# Patient Record
Sex: MALE | Race: WHITE | URBAN - METROPOLITAN AREA
[De-identification: names, ages, dates, MRNs, and addresses within clinical notes are randomized per-mention and may not be internally consistent; named-entity substitution may affect disease eponyms.]

---

## 2020-12-26 ENCOUNTER — EMERGENCY (EMERGENCY)
Facility: HOSPITAL | Age: 70
LOS: 1 days | Discharge: ANOTHER HOSPITAL | End: 2020-12-26
Attending: EMERGENCY MEDICINE | Admitting: EMERGENCY MEDICINE
Payer: MEDICARE

## 2020-12-26 ENCOUNTER — INPATIENT (INPATIENT)
Facility: HOSPITAL | Age: 70
LOS: 2 days | Discharge: HOME | DRG: 501 | End: 2020-12-29
Attending: STUDENT IN AN ORGANIZED HEALTH CARE EDUCATION/TRAINING PROGRAM | Admitting: STUDENT IN AN ORGANIZED HEALTH CARE EDUCATION/TRAINING PROGRAM
Payer: MEDICARE

## 2020-12-26 VITALS
DIASTOLIC BLOOD PRESSURE: 87 MMHG | RESPIRATION RATE: 18 BRPM | HEART RATE: 78 BPM | HEIGHT: 74 IN | TEMPERATURE: 98 F | SYSTOLIC BLOOD PRESSURE: 149 MMHG | WEIGHT: 169.98 LBS | OXYGEN SATURATION: 99 %

## 2020-12-26 VITALS
RESPIRATION RATE: 18 BRPM | OXYGEN SATURATION: 97 % | SYSTOLIC BLOOD PRESSURE: 126 MMHG | DIASTOLIC BLOOD PRESSURE: 81 MMHG | HEART RATE: 81 BPM | HEIGHT: 74 IN | TEMPERATURE: 97 F

## 2020-12-26 VITALS
SYSTOLIC BLOOD PRESSURE: 156 MMHG | DIASTOLIC BLOOD PRESSURE: 69 MMHG | OXYGEN SATURATION: 98 % | HEART RATE: 70 BPM | RESPIRATION RATE: 16 BRPM

## 2020-12-26 DIAGNOSIS — M54.5 LOW BACK PAIN: ICD-10-CM

## 2020-12-26 DIAGNOSIS — S52.572A OTHER INTRAARTICULAR FRACTURE OF LOWER END OF LEFT RADIUS, INITIAL ENCOUNTER FOR CLOSED FRACTURE: ICD-10-CM
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
## 2020-12-26 DIAGNOSIS — Z20.828 CONTACT WITH AND (SUSPECTED) EXPOSURE TO OTHER VIRAL COMMUNICABLE DISEASES: ICD-10-CM
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
## 2020-12-26 DIAGNOSIS — S62.102A FRACTURE OF UNSPECIFIED CARPAL BONE, LEFT WRIST, INITIAL ENCOUNTER FOR CLOSED FRACTURE: ICD-10-CM
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
## 2020-12-26 LAB
ALBUMIN SERPL ELPH-MCNC: 4.1 G/DL — SIGNIFICANT CHANGE UP (ref 3.4–5)
ALP SERPL-CCNC: 67 U/L — SIGNIFICANT CHANGE UP (ref 40–120)
ALT FLD-CCNC: 19 U/L — SIGNIFICANT CHANGE UP (ref 12–42)
ANION GAP SERPL CALC-SCNC: 10 MMOL/L — SIGNIFICANT CHANGE UP (ref 5–17)
ANION GAP SERPL CALC-SCNC: 12 MMOL/L — SIGNIFICANT CHANGE UP (ref 9–16)
APPEARANCE UR: CLEAR — SIGNIFICANT CHANGE UP
APTT BLD: 30.4 SEC — SIGNIFICANT CHANGE UP (ref 27.5–35.5)
AST SERPL-CCNC: 32 U/L — SIGNIFICANT CHANGE UP (ref 15–37)
BASOPHILS # BLD AUTO: 0.01 K/UL — SIGNIFICANT CHANGE UP (ref 0–0.2)
BASOPHILS NFR BLD AUTO: 0.2 % — SIGNIFICANT CHANGE UP (ref 0–2)
BILIRUB SERPL-MCNC: 0.7 MG/DL — SIGNIFICANT CHANGE UP (ref 0.2–1.2)
BILIRUB UR-MCNC: NEGATIVE — SIGNIFICANT CHANGE UP
BUN SERPL-MCNC: 26 MG/DL — HIGH (ref 7–23)
BUN SERPL-MCNC: 33 MG/DL — HIGH (ref 7–23)
CALCIUM SERPL-MCNC: 8.8 MG/DL — SIGNIFICANT CHANGE UP (ref 8.4–10.5)
CALCIUM SERPL-MCNC: 9.1 MG/DL — SIGNIFICANT CHANGE UP (ref 8.5–10.5)
CHLORIDE SERPL-SCNC: 108 MMOL/L — SIGNIFICANT CHANGE UP (ref 96–108)
CHLORIDE SERPL-SCNC: 111 MMOL/L — HIGH (ref 96–108)
CO2 SERPL-SCNC: 26 MMOL/L — SIGNIFICANT CHANGE UP (ref 22–31)
CO2 SERPL-SCNC: 27 MMOL/L — SIGNIFICANT CHANGE UP (ref 22–31)
COLOR SPEC: YELLOW — SIGNIFICANT CHANGE UP
CREAT SERPL-MCNC: 0.96 MG/DL — SIGNIFICANT CHANGE UP (ref 0.5–1.3)
CREAT SERPL-MCNC: 1.21 MG/DL — SIGNIFICANT CHANGE UP (ref 0.5–1.3)
DIFF PNL FLD: NEGATIVE — SIGNIFICANT CHANGE UP
EOSINOPHIL # BLD AUTO: 0.05 K/UL — SIGNIFICANT CHANGE UP (ref 0–0.5)
EOSINOPHIL NFR BLD AUTO: 0.8 % — SIGNIFICANT CHANGE UP (ref 0–6)
GLUCOSE SERPL-MCNC: 112 MG/DL — HIGH (ref 70–99)
GLUCOSE SERPL-MCNC: 127 MG/DL — HIGH (ref 70–99)
GLUCOSE UR QL: NEGATIVE — SIGNIFICANT CHANGE UP
HCT VFR BLD CALC: 39 % — SIGNIFICANT CHANGE UP (ref 39–50)
HGB BLD-MCNC: 12.8 G/DL — LOW (ref 13–17)
IMM GRANULOCYTES NFR BLD AUTO: 0.3 % — SIGNIFICANT CHANGE UP (ref 0–1.5)
INR BLD: 1 — SIGNIFICANT CHANGE UP (ref 0.88–1.16)
KETONES UR-MCNC: NEGATIVE — SIGNIFICANT CHANGE UP
LEUKOCYTE ESTERASE UR-ACNC: NEGATIVE — SIGNIFICANT CHANGE UP
LYMPHOCYTES # BLD AUTO: 0.96 K/UL — LOW (ref 1–3.3)
LYMPHOCYTES # BLD AUTO: 14.5 % — SIGNIFICANT CHANGE UP (ref 13–44)
MCHC RBC-ENTMCNC: 29.9 PG — SIGNIFICANT CHANGE UP (ref 27–34)
MCHC RBC-ENTMCNC: 32.8 GM/DL — SIGNIFICANT CHANGE UP (ref 32–36)
MCV RBC AUTO: 91.1 FL — SIGNIFICANT CHANGE UP (ref 80–100)
MONOCYTES # BLD AUTO: 0.34 K/UL — SIGNIFICANT CHANGE UP (ref 0–0.9)
MONOCYTES NFR BLD AUTO: 5.1 % — SIGNIFICANT CHANGE UP (ref 2–14)
NEUTROPHILS # BLD AUTO: 5.25 K/UL — SIGNIFICANT CHANGE UP (ref 1.8–7.4)
NEUTROPHILS NFR BLD AUTO: 79.1 % — HIGH (ref 43–77)
NITRITE UR-MCNC: NEGATIVE — SIGNIFICANT CHANGE UP
NRBC # BLD: 0 /100 WBCS — SIGNIFICANT CHANGE UP (ref 0–0)
PH UR: 7 — SIGNIFICANT CHANGE UP (ref 5–8)
PLATELET # BLD AUTO: 198 K/UL — SIGNIFICANT CHANGE UP (ref 150–400)
POTASSIUM SERPL-MCNC: 3.9 MMOL/L — SIGNIFICANT CHANGE UP (ref 3.5–5.3)
POTASSIUM SERPL-MCNC: 4.1 MMOL/L — SIGNIFICANT CHANGE UP (ref 3.5–5.3)
POTASSIUM SERPL-SCNC: 3.9 MMOL/L — SIGNIFICANT CHANGE UP (ref 3.5–5.3)
POTASSIUM SERPL-SCNC: 4.1 MMOL/L — SIGNIFICANT CHANGE UP (ref 3.5–5.3)
PROT SERPL-MCNC: 7.3 G/DL — SIGNIFICANT CHANGE UP (ref 6.4–8.2)
PROT UR-MCNC: NEGATIVE MG/DL — SIGNIFICANT CHANGE UP
PROTHROM AB SERPL-ACNC: 12 SEC — SIGNIFICANT CHANGE UP (ref 10.6–13.6)
RBC # BLD: 4.28 M/UL — SIGNIFICANT CHANGE UP (ref 4.2–5.8)
RBC # FLD: 13.3 % — SIGNIFICANT CHANGE UP (ref 10.3–14.5)
SARS-COV-2 RNA SPEC QL NAA+PROBE: SIGNIFICANT CHANGE UP
SODIUM SERPL-SCNC: 144 MMOL/L — SIGNIFICANT CHANGE UP (ref 135–145)
SODIUM SERPL-SCNC: 150 MMOL/L — HIGH (ref 132–145)
SP GR SPEC: 1.02 — SIGNIFICANT CHANGE UP (ref 1–1.03)
UROBILINOGEN FLD QL: 0.2 E.U./DL — SIGNIFICANT CHANGE UP
WBC # BLD: 6.63 K/UL — SIGNIFICANT CHANGE UP (ref 3.8–10.5)
WBC # FLD AUTO: 6.63 K/UL — SIGNIFICANT CHANGE UP (ref 3.8–10.5)

## 2020-12-26 PROCEDURE — 71045 X-RAY EXAM CHEST 1 VIEW: CPT | Mod: 26

## 2020-12-26 PROCEDURE — 70450 CT HEAD/BRAIN W/O DYE: CPT | Mod: 26

## 2020-12-26 PROCEDURE — 99222 1ST HOSP IP/OBS MODERATE 55: CPT | Mod: GC

## 2020-12-26 PROCEDURE — 99285 EMERGENCY DEPT VISIT HI MDM: CPT

## 2020-12-26 PROCEDURE — 73110 X-RAY EXAM OF WRIST: CPT | Mod: 26,LT

## 2020-12-26 PROCEDURE — 72192 CT PELVIS W/O DYE: CPT | Mod: 26

## 2020-12-26 PROCEDURE — 72131 CT LUMBAR SPINE W/O DYE: CPT | Mod: 26

## 2020-12-26 PROCEDURE — 73110 X-RAY EXAM OF WRIST: CPT | Mod: 26,76,RT

## 2020-12-26 PROCEDURE — 99284 EMERGENCY DEPT VISIT MOD MDM: CPT | Mod: 25

## 2020-12-26 PROCEDURE — 73200 CT UPPER EXTREMITY W/O DYE: CPT | Mod: 26,LT

## 2020-12-26 PROCEDURE — 93010 ELECTROCARDIOGRAM REPORT: CPT

## 2020-12-26 PROCEDURE — 93010 ELECTROCARDIOGRAM REPORT: CPT | Mod: 77

## 2020-12-26 RX ORDER — OMEPRAZOLE 10 MG/1
1 CAPSULE, DELAYED RELEASE ORAL
Qty: 0 | Refills: 0 | DISCHARGE

## 2020-12-26 RX ORDER — MORPHINE SULFATE 50 MG/1
2 CAPSULE, EXTENDED RELEASE ORAL ONCE
Refills: 0 | Status: DISCONTINUED | OUTPATIENT
Start: 2020-12-26 | End: 2020-12-26

## 2020-12-26 RX ORDER — ACETAMINOPHEN 500 MG
1000 TABLET ORAL EVERY 8 HOURS
Refills: 0 | Status: DISCONTINUED | OUTPATIENT
Start: 2020-12-26 | End: 2020-12-27

## 2020-12-26 RX ORDER — PANTOPRAZOLE SODIUM 20 MG/1
40 TABLET, DELAYED RELEASE ORAL
Refills: 0 | Status: DISCONTINUED | OUTPATIENT
Start: 2020-12-26 | End: 2020-12-27

## 2020-12-26 RX ORDER — OXYCODONE HYDROCHLORIDE 5 MG/1
5 TABLET ORAL EVERY 4 HOURS
Refills: 0 | Status: DISCONTINUED | OUTPATIENT
Start: 2020-12-26 | End: 2020-12-27

## 2020-12-26 RX ORDER — SODIUM CHLORIDE 9 MG/ML
1000 INJECTION, SOLUTION INTRAVENOUS
Refills: 0 | Status: DISCONTINUED | OUTPATIENT
Start: 2020-12-27 | End: 2020-12-29

## 2020-12-26 RX ORDER — OXYCODONE AND ACETAMINOPHEN 5; 325 MG/1; MG/1
1 TABLET ORAL ONCE
Refills: 0 | Status: DISCONTINUED | OUTPATIENT
Start: 2020-12-26 | End: 2020-12-26

## 2020-12-26 RX ORDER — ONDANSETRON 8 MG/1
4 TABLET, FILM COATED ORAL EVERY 6 HOURS
Refills: 0 | Status: DISCONTINUED | OUTPATIENT
Start: 2020-12-26 | End: 2020-12-27

## 2020-12-26 RX ORDER — HYDROMORPHONE HYDROCHLORIDE 2 MG/ML
0.5 INJECTION INTRAMUSCULAR; INTRAVENOUS; SUBCUTANEOUS EVERY 4 HOURS
Refills: 0 | Status: DISCONTINUED | OUTPATIENT
Start: 2020-12-26 | End: 2020-12-27

## 2020-12-26 RX ORDER — SENNA PLUS 8.6 MG/1
2 TABLET ORAL AT BEDTIME
Refills: 0 | Status: DISCONTINUED | OUTPATIENT
Start: 2020-12-26 | End: 2020-12-27

## 2020-12-26 RX ORDER — SODIUM CHLORIDE 9 MG/ML
1000 INJECTION INTRAMUSCULAR; INTRAVENOUS; SUBCUTANEOUS ONCE
Refills: 0 | Status: COMPLETED | OUTPATIENT
Start: 2020-12-26 | End: 2020-12-26

## 2020-12-26 RX ORDER — OXYCODONE HYDROCHLORIDE 5 MG/1
10 TABLET ORAL EVERY 4 HOURS
Refills: 0 | Status: DISCONTINUED | OUTPATIENT
Start: 2020-12-26 | End: 2020-12-27

## 2020-12-26 RX ADMIN — MORPHINE SULFATE 2 MILLIGRAM(S): 50 CAPSULE, EXTENDED RELEASE ORAL at 23:51

## 2020-12-26 RX ADMIN — SODIUM CHLORIDE 1000 MILLILITER(S): 9 INJECTION INTRAMUSCULAR; INTRAVENOUS; SUBCUTANEOUS at 18:20

## 2020-12-26 RX ADMIN — OXYCODONE AND ACETAMINOPHEN 1 TABLET(S): 5; 325 TABLET ORAL at 15:23

## 2020-12-26 NOTE — ED PROVIDER NOTE - CLINICAL SUMMARY MEDICAL DECISION MAKING FREE TEXT BOX
Pt S/P fall, lower back pain, L wrist deformity, likely fractured. Will get imaging, provide analgesia and reassess.

## 2020-12-26 NOTE — ED ADULT TRIAGE NOTE - CHIEF COMPLAINT QUOTE
c/o sacral pain and left wrist pain s/p mechanical fall. +deformity to wrist. denies head injury or AC use. c/o sacral pain and left wrist pain s/p mechanical fall. reports falling backwards and break fall with left arm. +deformity to wrist. denies head injury or AC use.

## 2020-12-26 NOTE — ED ADULT NURSE NOTE - OBJECTIVE STATEMENT
Patient alert and oriented x 3 came transferred from [facility] for ortho consult , patient s/p tripped and fall on the curb and fell backward was trying to break his fall with his left arm . Denies any head injury , no loc not on thinner . Noted soft cast on left wrist . With heplock on rt ac 18 g intact . With pain of 5/10 at this time . Awaiting to be seen , will continue to monitor .

## 2020-12-26 NOTE — ED ADULT NURSE NOTE - NSIMPLEMENTINTERV_GEN_ALL_ED
Implemented All Fall Risk Interventions:  [place] to call system. Call bell, personal items and telephone within reach. Instruct patient to call for assistance. Room bathroom lighting operational. Non-slip footwear when patient is off stretcher. Physically safe environment: no spills, clutter or unnecessary equipment. Stretcher in lowest position, wheels locked, appropriate side rails in place. Provide visual cue, wrist band, yellow gown, etc. Monitor gait and stability. Monitor for mental status changes and reorient to person, place, and time. Review medications for side effects contributing to fall risk. Reinforce activity limits and safety measures with patient and family.

## 2020-12-26 NOTE — ED ADULT NURSE NOTE - NSIMPLEMENTINTERV_GEN_ALL_ED
Implemented All Fall Risk Interventions:  [place] to call system. Call bell, personal items and telephone within reach. Instruct patient to call for assistance. Room bathroom lighting operational. Non-slip footwear when patient is off stretcher. Physically safe environment: no spills, clutter or unnecessary equipment. Stretcher in lowest position, wheels locked, appropriate side rails in place. Provide visual cue, wrist band, yellow gown, etc. Monitor gait and stability. Monitor for mental status changes and reorient to person, place, and time. Review medications for side effects contributing to fall risk. Reinforce activity limits and safety measures with patient and family.

## 2020-12-26 NOTE — ED ADULT NURSE NOTE - OTHER COMPLAINTS
transfer from [facility], pt reports tripping on curb, falling and sustaining fx to L wrist. upon arrival to ED, L wrist splinted.

## 2020-12-26 NOTE — ED PROVIDER NOTE - OBJECTIVE STATEMENT
69 y/o male with hx of GERD, constipation c/o left wrist fx. pt states he tripped and fell on curb this evening. pt notes pain to b/l wrists and scaral region. pt seen at [facility] and ct scan lumbar spine, pelvis no acute pathology. x-ray left wrist + fx and sent for ortho eval. Pt denies numbness, tingling or weakness. no head injury, loc or back pain. no cp or abd pain. no hip, knee or ankle pain. no further complaints.

## 2020-12-26 NOTE — ED ADULT NURSE NOTE - OBJECTIVE STATEMENT
Pt came in c/o of fall earlier today. PT states "I tripped on the curb today and fell backwards onto my left wrist. I didn't hit my head and am not on any blood thinners." Pt presents with left wrist deformity. Cap refill <3 seconds. Left radial pulse noted. Sensory intact in left hand/fingers. Pt denies fever chills, sob, chest pain, numbness/tingling in extremities. A&Ox3 speaking in full sentences.,

## 2020-12-26 NOTE — ED ADULT TRIAGE NOTE - HEIGHT IN FEET
Metformin 1000mg         Last Written Prescription Date: 01/13/17  Last Fill Quantity: 180, # refills: 3  Last Office Visit with G, P or [facility] prescribing provider:  08/11/16        BP Readings from Last 3 Encounters:   10/07/16 128/68   08/31/16 132/60   08/18/16 132/81     MICROL       24   8/15/2016  No results found for this basename: microalbumin  CREATININE   Date Value Ref Range Status   08/15/2016 1.12 0.66 - 1.25 mg/dL Final   ]  GFR ESTIMATE   Date Value Ref Range Status   08/15/2016 64 >60 mL/min/1.7m2 Final     Comment:     Non  GFR Calc   04/17/2016 60* >60 mL/min/1.7m2 Final     Comment:     Non  GFR Calc   04/15/2016 77 >60 mL/min/1.7m2 Final     Comment:     Non  GFR Calc     GFR ESTIMATE IF BLACK   Date Value Ref Range Status   08/15/2016 77 >60 mL/min/1.7m2 Final     Comment:      GFR Calc   04/17/2016 73 >60 mL/min/1.7m2 Final     Comment:      GFR Calc   04/15/2016 >90   GFR Calc   >60 mL/min/1.7m2 Final     CHOL      119   8/15/2016  HDL       35   8/15/2016  LDL       49   8/15/2016  TRIG      175   8/15/2016  CHOLHDLRATIO      3.3   5/8/2015  AST       17   8/15/2016  ALT       24   8/15/2016  A1C      7.0   8/15/2016  A1C      7.2   1/18/2016  A1C      6.8   5/8/2015  A1C      6.9   11/7/2014  A1C      6.3   5/8/2014  POTASSIUM   Date Value Ref Range Status   08/15/2016 4.1 3.4 - 5.3 mmol/L Final     Thank you -  Vilma Edmond, Pharmacy Technician  [facility]  On Behalf Of [facility]     6

## 2020-12-26 NOTE — ED PROVIDER NOTE - MUSCULOSKELETAL, MLM
Spine appears normal, range of motion is not limited, no c/t/l spine tenderness. no deformity. no bruising. left wrist in splint. distal NVI. left eblbow and shoulder non tender. right wrist non tender. FROM. distal NVI.

## 2020-12-26 NOTE — ED PROVIDER NOTE - OBJECTIVE STATEMENT
69 yo male pt, hx of GERD (omeprazole) and constipation (fiber supplements), presents after a fall. Pt stepped backwards on a sidewalk and tripped, fell backwards, had his L arm outstretched to brake fall. now w pain in lower back, L wrist pain (deformity). no LOC, no neck pain. not on AC.

## 2020-12-26 NOTE — H&P ADULT - HISTORY OF PRESENT ILLNESS
70M LHD hx GERD and chronic constipation s/p FOOSH with comminuted intraarticular L distal radius fracture. Denies head trauma or LOC. No other injuries. L wrist deformity and swelling. Denies numbness or tingling.

## 2020-12-26 NOTE — ED ADULT NURSE NOTE - DOES PATIENT HAVE ADVANCE DIRECTIVE
Date & Time: 3/3/2022, 4:45 PM  Patient: Payal Martinez  Encounter Provider(s):    RENEE Wong       To Whom It May Concern:    Abbey Burkitt was seen and treated in our department on 3/3/2022.      If you have any questions or concerns, please do not hesitate to call.        _____________________________  Physician/APC Signature No

## 2020-12-26 NOTE — ED ADULT NURSE NOTE - CHIEF COMPLAINT QUOTE
c/o sacral pain and left wrist pain s/p mechanical fall. reports falling backwards and break fall with left arm. +deformity to wrist. denies head injury or AC use.

## 2020-12-26 NOTE — ED PROVIDER NOTE - CLINICAL SUMMARY MEDICAL DECISION MAKING FREE TEXT BOX
left wrist fx. splint placed at [facility] and intact upon arrival. distal NVI. labs noted. sodium 150 and fluid given at [facility]. will repeat BMP. pt evaluated in ED by ortho and reduced by ortho. plan for admission and OR repair.

## 2020-12-26 NOTE — CONSULT NOTE ADULT - SUBJECTIVE AND OBJECTIVE BOX
HPI:    70M c GERD and chronic constipation presents s/p mechanical curb tonight. Patient ambulating this evening when fell over curb on outstretched hand. No light-headedness, cp, sob, palpitations, head trauma or LOC at time of event. At baseline patient _______, patient with no history of smoking or e-cig use. No history of easy bleeding or bruising. Patient admitted under ortho service for  ORI, medicine consulted for pre-op clearance.     PAST MEDICAL & SURGICAL HISTORY:  GERD (gastroesophageal reflux disease)    No significant past surgical history    Home Medications:  omeprazole 20 mg oral delayed release tablet: 1 tab(s) orally once a day (26 Dec 2020 19:44)    Allergies    No Known Allergies    Intolerances      FAMILY HISTORY:    Social History:  Denies EtOH, cigs and illicit drug use (26 Dec 2020 21:51)      Vital Signs (24 Hrs):  T(C): 36.1 (12-26-20 @ 19:26), Max: 36.6 (12-26-20 @ 17:14)  HR: 81 (12-26-20 @ 19:26) (67 - 81)  BP: 126/81 (12-26-20 @ 19:26) (126/78 - 156/69)  RR: 18 (12-26-20 @ 19:26) (16 - 18)  SpO2: 97% (12-26-20 @ 19:26) (97% - 99%)  Wt(kg): --      PHYSICAL EXAM:  GENERAL:   HEENT:  CHEST/LUNG:   HEART:   ABDOMEN:   EXTREMITIES:    PSYCH:   NEUROLOGY:  SKIN: No rashes or lesions      .  LABS:                         12.8   6.63  )-----------( 198      ( 26 Dec 2020 17:10 )             39.0     12-26    144  |  108  |  26<H>  ----------------------------<  112<H>  4.1   |  26  |  0.96    Ca    8.8      26 Dec 2020 21:49    TPro  7.3  /  Alb  4.1  /  TBili  0.7  /  DBili  x   /  AST  32  /  ALT  19  /  AlkPhos  67  12-26    PT/INR - ( 26 Dec 2020 17:10 )   PT: 12.0 sec;   INR: 1.00          PTT - ( 26 Dec 2020 17:10 )  PTT:30.4 sec            RADIOLOGY, EKG & ADDITIONAL TESTS: Reviewed.      HPI:    70M c GERD and chronic constipation presents s/p mechanical fall over curb tonight. Patient ambulating this evening when fell backward over curb on outstretched hand. No light-headedness, cp, sob, palpitations, head trauma or LOC at time of event. At baseline patient walks 5 flights of stairs daily with no issue, patient walks 1.5 miles twice a week, Patient with no history of smoking or e-cig use. No history of easy bleeding or bruising. Patient admitted under ortho service for [facility], medicine consulted for pre-op clearance.     PAST MEDICAL & SURGICAL HISTORY:  GERD (gastroesophageal reflux disease)    No significant past surgical history    Home Medications:  omeprazole 20 mg oral delayed release tablet: 1 tab(s) orally once a day (26 Dec 2020 19:44)  Fiber supplementation     Allergies    No Known Allergies    Intolerances      FAMILY HISTORY:    Social History:  Denies EtOH, cigs and illicit drug use (26 Dec 2020 21:51)      Vital Signs (24 Hrs):  T(C): 36.1 (12-26-20 @ 19:26), Max: 36.6 (12-26-20 @ 17:14)  HR: 81 (12-26-20 @ 19:26) (67 - 81)  BP: 126/81 (12-26-20 @ 19:26) (126/78 - 156/69)  RR: 18 (12-26-20 @ 19:26) (16 - 18)  SpO2: 97% (12-26-20 @ 19:26) (97% - 99%)  Wt(kg): --      PHYSICAL EXAM:  GENERAL: NAD  HEENT: MMM  CHEST/LUNG: CTA B/L    HEART: S1S2 RRR  ABDOMEN:  NTND BS+4  EXTREMITIES:  WWP. No Edema  PSYCH: Appropriately anxious pre-op  NEUROLOGY: No FNDs  SKIN: No rashes or lesions      .  LABS:                         12.8   6.63  )-----------( 198      ( 26 Dec 2020 17:10 )             39.0     12-26    144  |  108  |  26<H>  ----------------------------<  112<H>  4.1   |  26  |  0.96    Ca    8.8      26 Dec 2020 21:49    TPro  7.3  /  Alb  4.1  /  TBili  0.7  /  DBili  x   /  AST  32  /  ALT  19  /  AlkPhos  67  12-26    PT/INR - ( 26 Dec 2020 17:10 )   PT: 12.0 sec;   INR: 1.00          PTT - ( 26 Dec 2020 17:10 )  PTT:30.4 sec            RADIOLOGY, EKG & ADDITIONAL TESTS: Reviewed.

## 2020-12-26 NOTE — ED PROVIDER NOTE - MUSCULOSKELETAL, MLM
L wrist deformity, decreased rom due to pain, normal pulses and sensation distally, normal cap refil. L lower back paraspinal tenderness

## 2020-12-26 NOTE — H&P ADULT - NSHPPHYSICALEXAM_GEN_ALL_CORE
Gen: Awake and alert, NAD  L wrist with marked swelling and deformity  No open wounds  Sensation intact C5-T1  Axillary/AIN/PIN/U grossly preserved   2+ rad pulse  Fingers WWP

## 2020-12-26 NOTE — ED ADULT TRIAGE NOTE - OTHER COMPLAINTS
transfer from [facility], pt reports tripping on curb, falling and sustaining fx to L wrist. upon arrival to ED, L wrist splinted.

## 2020-12-26 NOTE — H&P ADULT - ASSESSMENT
70M LHD s/p CHANTEL with L DR comminuted intraarticular fx. Patient reduced with hematoma block. Sugartong splint applied. Tolerated procedure well. Remained neurovasc intact pre and post reduction.    - Post reduction xrays reviewed   - CT  - Pain control   - NWB LUE  - Elevate  - Medical risk stratification and optimization  - Restart home meds  - DVT ppx  - Regular diet  - NPO/IVF after midnight   - Dispo: pending OR

## 2020-12-26 NOTE — ED PROVIDER NOTE - PROGRESS NOTE DETAILS
Pt w isolated distal radius fx, otherwise normal trauma work up. Spoke to Dr Orona from Ortho, recommends admission to [facility] for surgical repair likely tomorrow. ordered all pre-op labs, EKG for admission. Discussed w Ortho will transfer to ED for reduction and then likely admission. Mild hypernatremia but pt well appearing, will hydrate and likely to get repeat later. to be followed up after transfer. Presented to Dr Motley in ED for transfer.

## 2020-12-26 NOTE — ED PROVIDER NOTE - ATTENDING CONTRIBUTION TO CARE
Pt w/ PMHx GERD, constipation presents s/p mech fall, s/p tripping on a curb. Pt fell backwards onto buttock and L wrist. Pt originally seen at [facility] w/ CTH / CT LS and pelvis, all negative. XR of b/l wrist w/ L wrist fx. Pt transferred for ortho eval and reduction. No other injuries or complaints. Addendum to attending statement: I have reviewed the ACP note and agree with the history, exam, and plan of care. I  was available to WILFRED Diaz  as a supervising provider if needed. PA given opportunity to ask questions and request further evaluation / care.     Pt w/ PMHx GERD, constipation transferred from [facility] for ortho eval for L wrist fx. Pt presented to [facility] s/p mech fall, s/p tripping on a curb. Pt fell backwards onto buttock and L wrist. Pt w/u at [facility] w/ CTH / CT LS and pelvis, all negative. XR of b/l wrist w/ L wrist fx. No other injuries or complaints.    Pt seen by ortho, reduced & splinted. Pt admitted to ortho for operative repair

## 2020-12-26 NOTE — CONSULT NOTE ADULT - ASSESSMENT
70M c GERD and chronic constipation presents s/p mechanical fall over curb tonight now pending L ORIF with ortho. Medicine consulted for pre-op clearance.         #Pre-op clearance  EKG: NSR. CXR WNL.        #Anemia 70M c GERD and chronic constipation presents s/p mechanical fall over curb tonight now pending L ORIF with ortho. Medicine consulted for pre-op clearance.         #Pre-op clearance  EKG: NSR. CXR WNL, UA negative.  METS >4 as patient with no difficulty climbing stairs, with no history of smoking. Patient is low risk for intermediate risk procedure.   - pain control per primary team  - Incentive spirometry    #Anemia  - recommend AM iron studies  70M c GERD and chronic constipation presents s/p mechanical fall over curb tonight now pending L ORIF with ortho. Medicine consulted for pre-op clearance.         #Pre-op clearance  EKG: NSR. CXR WNL, UA negative.  METS >4 as patient with no difficulty climbing stairs, with no history of smoking. Patient is low risk for intermediate risk procedure.   - pain control per primary team  - Incentive spirometry  - Bowel regimen while on pain control  - DVT ppx per primary team    #Anemia  - recommend AM iron studies

## 2020-12-27 LAB
ANION GAP SERPL CALC-SCNC: 10 MMOL/L — SIGNIFICANT CHANGE UP (ref 5–17)
BLD GP AB SCN SERPL QL: NEGATIVE — SIGNIFICANT CHANGE UP
BUN SERPL-MCNC: 19 MG/DL — SIGNIFICANT CHANGE UP (ref 7–23)
CALCIUM SERPL-MCNC: 8.5 MG/DL — SIGNIFICANT CHANGE UP (ref 8.4–10.5)
CHLORIDE SERPL-SCNC: 106 MMOL/L — SIGNIFICANT CHANGE UP (ref 96–108)
CO2 SERPL-SCNC: 24 MMOL/L — SIGNIFICANT CHANGE UP (ref 22–31)
CREAT SERPL-MCNC: 0.96 MG/DL — SIGNIFICANT CHANGE UP (ref 0.5–1.3)
GLUCOSE SERPL-MCNC: 101 MG/DL — HIGH (ref 70–99)
HCT VFR BLD CALC: 34.8 % — LOW (ref 39–50)
HCV AB S/CO SERPL IA: 0.05 S/CO — SIGNIFICANT CHANGE UP
HCV AB SERPL-IMP: SIGNIFICANT CHANGE UP
HGB BLD-MCNC: 11.6 G/DL — LOW (ref 13–17)
MCHC RBC-ENTMCNC: 30.4 PG — SIGNIFICANT CHANGE UP (ref 27–34)
MCHC RBC-ENTMCNC: 33.3 GM/DL — SIGNIFICANT CHANGE UP (ref 32–36)
MCV RBC AUTO: 91.3 FL — SIGNIFICANT CHANGE UP (ref 80–100)
NRBC # BLD: 0 /100 WBCS — SIGNIFICANT CHANGE UP (ref 0–0)
PLATELET # BLD AUTO: 172 K/UL — SIGNIFICANT CHANGE UP (ref 150–400)
POTASSIUM SERPL-MCNC: 3.6 MMOL/L — SIGNIFICANT CHANGE UP (ref 3.5–5.3)
POTASSIUM SERPL-SCNC: 3.6 MMOL/L — SIGNIFICANT CHANGE UP (ref 3.5–5.3)
RBC # BLD: 3.81 M/UL — LOW (ref 4.2–5.8)
RBC # FLD: 13.3 % — SIGNIFICANT CHANGE UP (ref 10.3–14.5)
RH IG SCN BLD-IMP: POSITIVE — SIGNIFICANT CHANGE UP
SODIUM SERPL-SCNC: 140 MMOL/L — SIGNIFICANT CHANGE UP (ref 135–145)
WBC # BLD: 7.19 K/UL — SIGNIFICANT CHANGE UP (ref 3.8–10.5)
WBC # FLD AUTO: 7.19 K/UL — SIGNIFICANT CHANGE UP (ref 3.8–10.5)

## 2020-12-27 PROCEDURE — 73100 X-RAY EXAM OF WRIST: CPT | Mod: 26,LT

## 2020-12-27 PROCEDURE — 24305 TENDON LNGTH UPR A/E EA TDN: CPT | Mod: 80,LT

## 2020-12-27 PROCEDURE — 25609 OPTX DST RD XART FX/EP SEP3+: CPT | Mod: 82,LT

## 2020-12-27 PROCEDURE — 73120 X-RAY EXAM OF HAND: CPT | Mod: 26,RT

## 2020-12-27 PROCEDURE — 25628 OPTX CARPL SCPHD FX INT FIXJ: CPT | Mod: 80,RT

## 2020-12-27 PROCEDURE — 25280 REVISE WRIST/FOREARM TENDON: CPT | Mod: 80,LT

## 2020-12-27 RX ORDER — PANTOPRAZOLE SODIUM 20 MG/1
40 TABLET, DELAYED RELEASE ORAL
Refills: 0 | Status: DISCONTINUED | OUTPATIENT
Start: 2020-12-27 | End: 2020-12-29

## 2020-12-27 RX ORDER — CEFAZOLIN SODIUM 1 G
2000 VIAL (EA) INJECTION EVERY 8 HOURS
Refills: 0 | Status: COMPLETED | OUTPATIENT
Start: 2020-12-27 | End: 2020-12-28

## 2020-12-27 RX ORDER — ACETAMINOPHEN 500 MG
1000 TABLET ORAL EVERY 8 HOURS
Refills: 0 | Status: DISCONTINUED | OUTPATIENT
Start: 2020-12-27 | End: 2020-12-29

## 2020-12-27 RX ORDER — INFLUENZA VIRUS VACCINE 15; 15; 15; 15 UG/.5ML; UG/.5ML; UG/.5ML; UG/.5ML
0.7 SUSPENSION INTRAMUSCULAR ONCE
Refills: 0 | Status: DISCONTINUED | OUTPATIENT
Start: 2020-12-27 | End: 2020-12-29

## 2020-12-27 RX ORDER — INFLUENZA VIRUS VACCINE 15; 15; 15; 15 UG/.5ML; UG/.5ML; UG/.5ML; UG/.5ML
0.5 SUSPENSION INTRAMUSCULAR ONCE
Refills: 0 | Status: DISCONTINUED | OUTPATIENT
Start: 2020-12-27 | End: 2020-12-27

## 2020-12-27 RX ORDER — HYDROMORPHONE HYDROCHLORIDE 2 MG/ML
0.5 INJECTION INTRAMUSCULAR; INTRAVENOUS; SUBCUTANEOUS EVERY 4 HOURS
Refills: 0 | Status: DISCONTINUED | OUTPATIENT
Start: 2020-12-27 | End: 2020-12-29

## 2020-12-27 RX ORDER — OXYCODONE HYDROCHLORIDE 5 MG/1
10 TABLET ORAL EVERY 4 HOURS
Refills: 0 | Status: DISCONTINUED | OUTPATIENT
Start: 2020-12-27 | End: 2020-12-29

## 2020-12-27 RX ORDER — TOBRAMYCIN 0.3 %
1 DROPS OPHTHALMIC (EYE)
Refills: 0 | Status: DISCONTINUED | OUTPATIENT
Start: 2020-12-27 | End: 2020-12-29

## 2020-12-27 RX ORDER — ONDANSETRON 8 MG/1
4 TABLET, FILM COATED ORAL EVERY 6 HOURS
Refills: 0 | Status: DISCONTINUED | OUTPATIENT
Start: 2020-12-27 | End: 2020-12-29

## 2020-12-27 RX ORDER — OXYCODONE HYDROCHLORIDE 5 MG/1
5 TABLET ORAL EVERY 4 HOURS
Refills: 0 | Status: DISCONTINUED | OUTPATIENT
Start: 2020-12-27 | End: 2020-12-29

## 2020-12-27 RX ORDER — SENNA PLUS 8.6 MG/1
2 TABLET ORAL AT BEDTIME
Refills: 0 | Status: DISCONTINUED | OUTPATIENT
Start: 2020-12-27 | End: 2020-12-29

## 2020-12-27 RX ADMIN — OXYCODONE HYDROCHLORIDE 5 MILLIGRAM(S): 5 TABLET ORAL at 00:50

## 2020-12-27 RX ADMIN — OXYCODONE HYDROCHLORIDE 10 MILLIGRAM(S): 5 TABLET ORAL at 12:20

## 2020-12-27 RX ADMIN — PANTOPRAZOLE SODIUM 40 MILLIGRAM(S): 20 TABLET, DELAYED RELEASE ORAL at 05:05

## 2020-12-27 RX ADMIN — SODIUM CHLORIDE 120 MILLILITER(S): 9 INJECTION, SOLUTION INTRAVENOUS at 00:42

## 2020-12-27 RX ADMIN — OXYCODONE HYDROCHLORIDE 10 MILLIGRAM(S): 5 TABLET ORAL at 06:03

## 2020-12-27 RX ADMIN — OXYCODONE HYDROCHLORIDE 10 MILLIGRAM(S): 5 TABLET ORAL at 12:50

## 2020-12-27 RX ADMIN — Medication 1 DROP(S): at 23:30

## 2020-12-27 RX ADMIN — SENNA PLUS 2 TABLET(S): 8.6 TABLET ORAL at 22:16

## 2020-12-27 RX ADMIN — MORPHINE SULFATE 2 MILLIGRAM(S): 50 CAPSULE, EXTENDED RELEASE ORAL at 00:50

## 2020-12-27 RX ADMIN — SODIUM CHLORIDE 120 MILLILITER(S): 9 INJECTION, SOLUTION INTRAVENOUS at 00:01

## 2020-12-27 RX ADMIN — OXYCODONE HYDROCHLORIDE 10 MILLIGRAM(S): 5 TABLET ORAL at 05:04

## 2020-12-27 RX ADMIN — Medication 100 MILLIGRAM(S): at 22:13

## 2020-12-27 RX ADMIN — OXYCODONE HYDROCHLORIDE 5 MILLIGRAM(S): 5 TABLET ORAL at 19:58

## 2020-12-27 NOTE — BRIEF OPERATIVE NOTE - NSICDXBRIEFPREOP_GEN_ALL_CORE_FT
PRE-OP DIAGNOSIS:  Fracture of scaphoid of right wrist 27-Dec-2020 14:17:08  Eamon Bradley  Distal radius fracture, left 27-Dec-2020 14:16:45  Eamon Bradley

## 2020-12-27 NOTE — BRIEF OPERATIVE NOTE - NSICDXBRIEFPOSTOP_GEN_ALL_CORE_FT
POST-OP DIAGNOSIS:  Fracture of scaphoid of right wrist 27-Dec-2020 14:17:30  Eamon Bradley  Distal radius fracture, left 27-Dec-2020 14:17:19  Eamon Bradley

## 2020-12-27 NOTE — PATIENT PROFILE ADULT - NSASFALLNEEDSASSISTWITH_GEN_A_NUR
Assessment and Plan:    Problem List Items Addressed This Visit     Late onset Alzheimer's disease without behavioral disturbance - Primary (Chronic)    Closed compression fracture of L1 lumbar vertebra ([address] )      Other Visit Diagnoses     Medicare annual wellness visit, subsequent        Risk for falls        Screening for diabetes mellitus (DM)        Postmenopausal            Health Maintenance Due   Topic Date Due    DTaP,Tdap,and Td Vaccines (1 - Tdap) 10/24/1959    Fall Risk  10/24/2003    Urinary Incontinence Screening  10/24/2003         HPI:  Catalina Randall is a [de-identified] y o  female here for her Subsequent Wellness Visit      Patient Active Problem List   Diagnosis    Late onset Alzheimer's disease without behavioral disturbance    Suspected UTI (urinary tract infection)    Closed compression fracture of L1 lumbar vertebra (HCC)     Past Medical History:   Diagnosis Date    Acute cystitis without hematuria     Last assessed 12/19/15    Diverticulitis of colon     Hyperthyroidism     Polymyalgia rheumatica (HCC)     Vertigo     last assessed 01/15/2013     Past Surgical History:   Procedure Laterality Date    COLECTOMY      partial-sigmoid    COLONOSCOPY      THYROID SURGERY      Clint-Thyroidectomy    TOTAL ABDOMINAL HYSTERECTOMY W/ BILATERAL SALPINGOOPHORECTOMY       Family History   Problem Relation Age of Onset    Alzheimer's disease Mother     Heart attack Father     Seizures Father     Cancer Brother         mass in axilla     History   Smoking Status    Never Smoker   Smokeless Tobacco    Never Used     History   Alcohol Use No     Comment: social      History   Drug Use No       Current Outpatient Prescriptions   Medication Sig Dispense Refill    acetaminophen (TYLENOL) 325 mg tablet Take 3 tablets (975 mg total) by mouth every 8 (eight) hours 30 tablet 0    aspirin 81 mg chewable tablet Chew 81 mg daily      bisacodyl (DULCOLAX) 10 mg suppository Insert 1 suppository (10 mg total) into the rectum daily as needed for constipation 12 suppository 0    lidocaine (LIDODERM) 5 % Apply 1 patch topically daily Remove & Discard patch within 12 hours or as directed by MD 30 patch 0    melatonin 1 mg Take by mouth      melatonin 3 mg Take 3 mg by mouth daily at bedtime       No current facility-administered medications for this visit        No Known Allergies  Immunization History   Administered Date(s) Administered    Influenza 09/21/2016, 10/08/2018    Influenza Split High Dose Preservative Free IM 12/05/2013, 11/03/2014, 12/15/2015, 09/07/2016    Influenza TIV (IM) 01/01/2012    Influenza, high dose seasonal 0 5 mL 10/08/2018    Pneumococcal Conjugate 13-Valent 08/16/2017    Pneumococcal Polysaccharide PPV23 01/01/2012       Patient Care Team:  Guera Curtis DO as PCP - General (Family Medicine)  Marcello Kapoor [id number], NILO Seo, RN as Lead OP Care Mgr (Care Coordination)    Medicare Screening Tests and Risk Assessments:  Medicare Annual Wellness Visit standing

## 2020-12-27 NOTE — BRIEF OPERATIVE NOTE - NSICDXBRIEFPROCEDURE_GEN_ALL_CORE_FT
PROCEDURES:  ORIF, fracture, scaphoid 27-Dec-2020 14:16:24  Eamon Bradley  ORIF fracture of left distal radius and ulna 27-Dec-2020 14:16:14  Eamon Bradley

## 2020-12-27 NOTE — PACU DISCHARGE NOTE - COMMENTS
pt is fully awake,stable vital signs  pt denies pain,bilateral arm slings in place.no numbness or tingling sensation reported by pt,able to move fingers.  pt was straight cath, after complaints of inabilty to urinate.  bladder scan was >600 and was able to obtain 1000 liter per catheter.  pt is comfortable now

## 2020-12-28 LAB
APPEARANCE UR: CLEAR — SIGNIFICANT CHANGE UP
BACTERIA # UR AUTO: PRESENT /HPF
BILIRUB UR-MCNC: NEGATIVE — SIGNIFICANT CHANGE UP
COLOR SPEC: YELLOW — SIGNIFICANT CHANGE UP
DIFF PNL FLD: ABNORMAL
EPI CELLS # UR: SIGNIFICANT CHANGE UP /HPF (ref 0–5)
GLUCOSE UR QL: NEGATIVE — SIGNIFICANT CHANGE UP
KETONES UR-MCNC: >=80 MG/DL
LEUKOCYTE ESTERASE UR-ACNC: NEGATIVE — SIGNIFICANT CHANGE UP
NITRITE UR-MCNC: NEGATIVE — SIGNIFICANT CHANGE UP
PH UR: 5.5 — SIGNIFICANT CHANGE UP (ref 5–8)
PROT UR-MCNC: NEGATIVE MG/DL — SIGNIFICANT CHANGE UP
RBC CASTS # UR COMP ASSIST: ABNORMAL /HPF
SP GR SPEC: 1.02 — SIGNIFICANT CHANGE UP (ref 1–1.03)
UROBILINOGEN FLD QL: 0.2 E.U./DL — SIGNIFICANT CHANGE UP
WBC UR QL: < 5 /HPF — SIGNIFICANT CHANGE UP

## 2020-12-28 PROCEDURE — 71045 X-RAY EXAM CHEST 1 VIEW: CPT | Mod: 26

## 2020-12-28 PROCEDURE — 99233 SBSQ HOSP IP/OBS HIGH 50: CPT | Mod: GC

## 2020-12-28 RX ORDER — IBUPROFEN 200 MG
400 TABLET ORAL EVERY 8 HOURS
Refills: 0 | Status: DISCONTINUED | OUTPATIENT
Start: 2020-12-28 | End: 2020-12-29

## 2020-12-28 RX ADMIN — PANTOPRAZOLE SODIUM 40 MILLIGRAM(S): 20 TABLET, DELAYED RELEASE ORAL at 06:03

## 2020-12-28 RX ADMIN — Medication 1000 MILLIGRAM(S): at 06:41

## 2020-12-28 RX ADMIN — OXYCODONE HYDROCHLORIDE 10 MILLIGRAM(S): 5 TABLET ORAL at 03:15

## 2020-12-28 RX ADMIN — SENNA PLUS 2 TABLET(S): 8.6 TABLET ORAL at 20:42

## 2020-12-28 RX ADMIN — OXYCODONE HYDROCHLORIDE 10 MILLIGRAM(S): 5 TABLET ORAL at 04:00

## 2020-12-28 RX ADMIN — Medication 400 MILLIGRAM(S): at 07:04

## 2020-12-28 RX ADMIN — Medication 100 MILLIGRAM(S): at 06:02

## 2020-12-28 RX ADMIN — Medication 1000 MILLIGRAM(S): at 21:18

## 2020-12-28 RX ADMIN — Medication 1000 MILLIGRAM(S): at 20:42

## 2020-12-28 RX ADMIN — Medication 1000 MILLIGRAM(S): at 05:29

## 2020-12-28 NOTE — DISCHARGE NOTE PROVIDER - NSDCACTIVITY_GEN_ALL_CORE
tylenol prn  currently afeb.  tmax 101.1 Do not drive or operate machinery/Do not make important decisions Do not drive or operate machinery/Do not make important decisions/Stairs allowed/Walking - Indoors allowed/Walking - Outdoors allowed

## 2020-12-28 NOTE — OCCUPATIONAL THERAPY INITIAL EVALUATION ADULT - ANTICIPATED DISCHARGE DISPOSITION, OT EVAL
Assessment    1  History of stroke (V12 54) (Z86 73)   2  Hashimoto's thyroiditis (245 2) (E06 3)   3  Complex partial epilepsy with generalization and with intractable epilepsy (345 41)   (G40 219)   4  Obesity (BMI 30-39 9) (278 00) (E66 9)   5  Family history of AAA (abdominal aortic aneurysm), not a candidate for repair : Mother    Plan  Anxiety    · ClonazePAM 2 MG Oral Tablet  FamHx: AAA (abdominal aortic aneurysm), not a candidate for repair    · US ABDOMINAL AORTA SCREENING AAA; Status:Active; Requested QRS:[id number]; Hashimoto's thyroiditis, Obesity (BMI 30-39  9)    · (1) TSH; Status:Active; Requested NZR:[id number];   Obesity (BMI 30-39  9)    · (1) CBC/PLT/DIFF; Status:Active; Requested ZGI:[id number];    · (1) COMPREHENSIVE METABOLIC PANEL; Status:Active; Requested DLZ:[id number];    · (1) LIPID PANEL, FASTING; Status:Active; Requested LUNA:[id number];    · Begin or continue regular aerobic exercise  Gradually work up to at least 3 sessions of 30  minutes of exercise a week ; Status:Complete;   Done: 09XMU3221   · We recommend that you bring your body mass index down to 26 ; Status:Complete;    Done: 11LEQ3971  PMH: Encounter for screening mammogram for breast cancer    · * MAMMO SCREENING BILATERAL W CAD; Status:Hold For - Scheduling,Retrospective  Authorization; Requested YSY:[id number];   PMH: Pap smear for cervical cancer screening    · (Q) THINPREP PAP; Status:Active - Retrospective Authorization; Requested  NDR:[id number];   PMH: Rhus dermatitis    · Betamethasone Dipropionate Aug 0 05 % External Cream (Diprolene AF)    Discussion/Summary  health maintenance visit Currently, she eats an adequate diet and has an inadequate exercise regimen   the risks and benefits of cervical cancer screening were discussed the patient declines cervical cancer screening Encouraged to follow-up with GYN for Pap smears it's been probably 10 years or more since her last one by her recollection Breast cancer screening: the risks and benefits of breast cancer screening were discussed, mammogram has been ordered and She agrees to mammography and we gave her a requisition for same  Colorectal cancer screening: the risks and benefits of colorectal cancer screening were discussed, fecal occult blood testing supplies were given and She declines colonoscopy but she agrees to fecal occult blood testing and a fit test was given  Osteoporosis screening: bone mineral density testing is not indicated  Screening lab work includes hemoglobin, glucose and lipid profile  Advice and education were given regarding nutrition and aerobic exercise  Patient discussion: discussed with the patient  In summary then this is a 49-year-old woman who presents for health maintenance visit  She has had no health maintenance visit for several years  We discussed multiple issues today  We will have her start by getting fasting blood work to include CBC CMP lipids and thyroid function testing due to her history of Hashimoto's thyroiditis which has not been evaluated recently in addition to her family history of coronary and aortic disease  We will discuss with her when results of her lipids and CMP are available  We also ask her to go for an ultrasound of the aorta screening due to her mother's diagnosis of AAA  Mother also has diagnosis of coronary disease diagnosed in her 76s  Patient is currently asymptomatic  We will discuss further evaluation pending results of blood work  She agrees  Chief Complaint  My biggest concern is my Mom wi I get a pain th CAD and a AAA, diverticulitis  On occasion I have a pain in my L arm which is not exertional  No seizure since surgery  Mother with PTCA a year or so ago at age 76-67  No DM, hyperlipidemia  No HTN  No FBW by history  She has a history of hashimotos  I think I have lost about 10 pounds  No recent mammogram       History of Present Illness  HM, Adult Female:  The patient is being seen for a health maintenance evaluation  Social History: Household members include spouse  She is   Work status: occupation: Homemaker  The patient has never smoked cigarettes  She reports rare alcohol use  She has never used illicit drugs  General Health: The patient's health since the last visit is described as fair  She has regular dental visits  She complains of vision problems  Vision care includes wearing glasses  She denies hearing loss  Immunizations status:  No recent immunizations  Lifestyle:  She consumes a diverse and healthy diet  She has weight concerns  She does not exercise regularly  She does not use tobacco  She denies alcohol use  She denies drug use  Reproductive health: the patient is postmenopausal   Age 52 menopause  Takes calcium plus D    Screening: Cervical cancer screening includes a colposcopy last performed Many years ago  and 10 years or more ago  No history of abnormal PAP  Breast cancer screening includes a mammogram performed 10 years ago  Colorectal cancer screening includes a colonoscopy performed Many years ago and No history of polyps  Metabolic screening includes uncertain timing of her last lipid profile and no previous DEXA  HPI: Patient is here for an initial health maintenance visit  She has a history of complicated seizure disorder  She had intractable seizures which were eventually remedied with temporal lobectomy  She is currently maintained on Vimpat through neurology and has been seizure-free for 4 years  She takes citalopram for anxiety and depression  Her mother was diagnosed with a AAA as well as coronary disease in her 76s and she's concerned about this  She's had no metabolic testing anytime recently  She is obese does not get any routine regular exercise   She has had an improved diet recently as her  is a diagnosed diabetic and they've been following a low carbohydrate/diabetic diet she's had no recent mammography, no recent colonoscopy though she did have one in the distant past for rectal bleeding apparently and no Pap smear for many years  She has no  or GI complaint currently  Review of Systems    Constitutional: no recent weight gain  Eyes: eyesight problems  Cardiovascular: No exertional chest pain palpitations edema etc , but No complaints of slow heart rate, no fast heart rate, no chest pain, no palpitations, no leg claudication, no lower extremity edema  Respiratory: no shortness of breath, no orthopnea and no PND  Gastrointestinal: She denies change of bowel habits, but no abdominal pain, no constipation and no diarrhea  Genitourinary: Overdue for gynecologic testing  Neurological: convulsions and She has a history of seizure disorder which was intractable but remedied with temporal lobectomy approximately 4 years ago  She continues to follow with neurology  She is maintained on Vimpat , but no headache  Psychiatric: anxiety and She takes citalopram for anxiety and depression  Active Problems    1  Anxiety (300 00) (F41 9)   2  Complex partial epilepsy with generalization and with intractable epilepsy (345 41)   (G40 219)   3  Idiopathic insomnia (307 42) (F51 01)    Past Medical History    · History of stroke (V12 54) (Z86 73)    Surgical History    · History of Craniotomy For Temporal Lobectomy With Electrocorticography    Family History  Mother    · Family history of AAA (abdominal aortic aneurysm), not a candidate for repair    Social History    · Never a smoker    Current Meds   1  Betamethasone Dipropionate Aug 0 05 % External Cream; APPLY THIN LAYER TO   AFFECTED AREA AND RUB IN TWICE A DAY; Therapy: 80Pcs2635 to (Last Rx:07Hiy0155)  Requested for: 13Ujj3033 Ordered   2  Citalopram Hydrobromide 10 MG Oral Tablet; Take 1 tablet daily; Therapy: 98RHJ8908 to (Evaluate:07Lfu6216)  Requested for: 93PJM2281; Last   Rx:[id number] Ordered   3  ClonazePAM 2 MG Oral Tablet; TAKE 1 TABLET Bedtime;    Therapy: 26FHP1640 to (Evaluate:02Fsm9119); Last Rx:[id number] Ordered   4  Vimpat 100 MG Oral Tablet; take 1 tablet twice a day; Therapy: 11FBK9039 to (Last Rx:28Apr2017) Ordered    Allergies    1  No Known Drug Allergies    Vitals   Recorded: 78RBO0487 02:05PM   Temperature 10 1 F   Systolic 279, LUE, Sitting   Diastolic 60, LUE, Sitting   Height 5 ft    Weight 178 lb    BMI Calculated 34 76   BSA Calculated 1 77     Physical Exam    Constitutional   General appearance: Abnormal   well developed, obese and appearance reflects stated age  Eyes   Conjunctiva and lids: No swelling, erythema or discharge  Pupils and irises: Equal, round, reactive to light  Ophthalmoscopic examination: Normal fundi and optic discs  Ears, Nose, Mouth, and Throat   Otoscopic examination: Tympanic membranes translucent with normal light reflex  Canals patent without erythema  Lips, teeth, and gums: Normal, good dentition  Oropharynx: Normal with no erythema, edema, exudate or lesions  Neck   Neck: Supple, symmetric, trachea midline, no masses  Thyroid: Normal, no thyromegaly  Pulmonary   Respiratory effort: No increased work of breathing or signs of respiratory distress  Auscultation of lungs: Clear to auscultation  Cardiovascular   Auscultation of heart: Normal rate and rhythm, normal S1 and S2, no murmurs  The heart rate was normal at 68 bpm  The rhythm was regular  Heart sounds: normal S1, normal S2 and no gallop heard  no murmurs were heard  Carotid pulses: 2+ bilaterally  No bruit  Abdominal aorta: Normal   No bruit  Examination of extremities for edema and/or varicosities: Normal     Abdomen   Abdomen: Abnormal   The abdomen was obese  Bowel sounds were normal  The abdomen was soft and nontender  no masses palpated  no CVA tenderness   Liver and spleen: No hepatomegaly or splenomegaly  Lymphatic   Palpation of lymph nodes in neck: No lymphadenopathy      Musculoskeletal   Gait and station: Normal     Psychiatric Orientation to person, place, and time: Normal     Mood and affect: Normal        Future Appointments    Date/Time Provider Specialty Site   04/17/2018 11:00 AM CINTHYA Bueno   Neurology 0T [place] [facility]     Signatures   Electronically signed by : CINTHYA Solares ; Jun 8 2017  2:59PM EST                       (Author) DC home with family assist. No skilled OT needs./no needs/home w/ level of assist

## 2020-12-28 NOTE — DISCHARGE NOTE PROVIDER - NSDCCPCAREPLAN_GEN_ALL_CORE_FT
PRINCIPAL DISCHARGE DIAGNOSIS  Diagnosis: Wrist fracture  Assessment and Plan of Treatment:        PRINCIPAL DISCHARGE DIAGNOSIS  Diagnosis: Distal radius fracture, right  Assessment and Plan of Treatment:       SECONDARY DISCHARGE DIAGNOSES  Diagnosis: Scaphoid fracture  Assessment and Plan of Treatment: left

## 2020-12-28 NOTE — OCCUPATIONAL THERAPY INITIAL EVALUATION ADULT - LIVES WITH, PRO[place] staying with his sister who lives nearby upon discharge from hospital/alone

## 2020-12-28 NOTE — DISCHARGE NOTE PROVIDER - NSDCCPTREATMENT_GEN_ALL_CORE_FT
PRINCIPAL PROCEDURE  Procedure: ORIF fracture of left distal radius and ulna  Findings and Treatment:       SECONDARY PROCEDURE  Procedure: ORIF, fracture, scaphoid  Findings and Treatment:

## 2020-12-28 NOTE — DISCHARGE NOTE PROVIDER - HOSPITAL COURSE
Admit to Orthopaedics - left distal radius and right scaphoid open reduction internal fixation   Perioperative Antibiotics  DVT prophylaxis  Physical Therapy  Pain Management Admit to Orthopaedics - left distal radius and right scaphoid open reduction internal fixation   Perioperative Antibiotics  DVT prophylaxis  Occupational Therapy  Pain Management

## 2020-12-28 NOTE — DISCHARGE NOTE PROVIDER - NSDCFUADDINST_GEN_ALL_CORE_FT
You are non weight bearing on your left and right upper extremities - you are allowed to weight bear for feeding yourself.  No strenuous activity, driving or returning to work until cleared by MD.  Keep splints clean and dry at all times - do not remove until you see your doctor outpatient for follow up  Try to have regular bowel movements, take stool softener or laxative if necessary.  May take pepcid or zantac for upset stomach.    Follow up with Dr. Watkins to schedule an appt, if you have staples or sutures they will be removed in office.  Contact your doctor if you experience: fever greater than 101.5, chills, chest pain, difficulty breathing, redness or excessive drainage around the incision, other concerns.  You are non weight bearing on your left and right upper extremities - you are allowed to weight bear for feeding yourself.  No strenuous activity, driving or returning to work until cleared by MD.  Keep splints clean and dry at all times - do not remove until you see your doctor outpatient for follow up. Do not get wet.  Try to have regular bowel movements, take stool softener or laxative if necessary.  May take pepcid or zantac for upset stomach.    Follow up with Dr. Watkins to schedule an appt, if you have staples or sutures they will be removed in office.  Contact your doctor if you experience: fever greater than 101.5, chills, chest pain, difficulty breathing, redness or excessive drainage around the incision, other concerns.

## 2020-12-28 NOTE — DISCHARGE NOTE PROVIDER - CARE PROVIDER_API CALL
Bruno Watkins)  Orthopaedic Surgery Surgery  [address] [place] [address], [address]  [place] [address]  Phone: [phone]  Fax: [phone]  Follow Up Time:    Bruno Watkins)  Orthopaedic Surgery Surgery  [address] [place] [address], [address]  [place] [address]  Phone: [phone]  Fax: [phone]  Follow Up Time: 2 weeks

## 2020-12-28 NOTE — DISCHARGE NOTE PROVIDER - NSDCMRMEDTOKEN_GEN_ALL_CORE_FT
omeprazole 20 mg oral delayed release tablet: 1 tab(s) orally once a day   omeprazole 20 mg oral delayed release tablet: 1 tab(s) orally once a day  oxycodone-acetaminophen 5 mg-325 mg oral tablet: 1-2  tab(s) orally every 4-6 hours, As Needed -for severe pain MDD:10

## 2020-12-28 NOTE — OCCUPATIONAL THERAPY INITIAL EVALUATION ADULT - DIAGNOSIS, OT EVAL
Pt presents s/p CHANTEL with R scaphoid Fx & L distal radius Fx, now POD 1 with NWB BUE's, resulting in decreased ADLs.

## 2020-12-29 VITALS
TEMPERATURE: 100 F | HEART RATE: 93 BPM | OXYGEN SATURATION: 99 % | SYSTOLIC BLOOD PRESSURE: 127 MMHG | DIASTOLIC BLOOD PRESSURE: 69 MMHG | RESPIRATION RATE: 17 BRPM

## 2020-12-29 PROCEDURE — 99233 SBSQ HOSP IP/OBS HIGH 50: CPT | Mod: GC

## 2020-12-29 RX ADMIN — PANTOPRAZOLE SODIUM 40 MILLIGRAM(S): 20 TABLET, DELAYED RELEASE ORAL at 05:13

## 2020-12-29 RX ADMIN — OXYCODONE HYDROCHLORIDE 10 MILLIGRAM(S): 5 TABLET ORAL at 07:56

## 2020-12-29 RX ADMIN — OXYCODONE HYDROCHLORIDE 10 MILLIGRAM(S): 5 TABLET ORAL at 08:26

## 2020-12-29 NOTE — PROGRESS NOTE ADULT - ASSESSMENT
70M c GERD and chronic constipation presents s/p mechanical fall over curb tonight now pending L ORIF with ortho; plan for OR today.    #Pre-op clearance  low risk for intermediate procedure  plan for OR today  - pain control per primary team  - Incentive spirometry  - Bowel regimen while on pain control  - DVT ppx per primary team     #anemia  stable  recommend iron studies
70 year old M s/p fall w/ R scaphoid and L wrist fx s/p ORIF of scaphoid and ORIF of radial/ulnar fracture.     #Fever: likely post-op and may be due to atelectasis, afebrile now, can check WBC, no overt s/s of infection,   #Post-op care: c/w pain control, c/w bowel regimen, c/w IS  #Urinary retention: resolved, likely 2/2 anesthesia  #Dispo: home today   
70 year old M s/p fall w/ R scaphoid and L wrist fx s/p ORIF of scaphoid and ORIF of radial/ulnar fracture.     #Fever: likely post-op and may be due to atelectasis, afebrile now, no overt s/s of infection, if continues to be febrile into tomorrow would obtain blood cultures  #Post-op care: c/w pain control, c/w bowel regimen, c/w IS  #Urinary retention: likely 2/2 anesthesia, voided 50 cc this AM, c/w TOV, if persistently retaining can straight cath and start flomax  #Anemia: likely acute blood loss 2/2 OR,   #Dispo: home today or tomorrow

## 2020-12-29 NOTE — PROGRESS NOTE ADULT - REASON FOR ADMISSION
DESHAWN kimble

## 2020-12-29 NOTE — DISCHARGE NOTE NURSING/CASE MANAGEMENT/SOCIAL WORK - NSDCPNPNATDISSUGG_GEN_ALL_CORE
tablet Take 1 tablet by mouth nightly Yes Carolina Yost MD   blood glucose monitor kit and supplies Test 2 times a day & as needed for symptoms of irregular blood glucose. Yes Carolina Yost MD   ibuprofen (ADVIL;MOTRIN) 400 MG tablet For 7 days 3 times daily then as needed Yes Carolina Yost MD   lisdexamfetamine (VYVANSE) 30 MG capsule Take 1 capsule by mouth every morning for 30 days.   Carolina Yost MD       Social History     Tobacco Use    Smoking status: Never Smoker    Smokeless tobacco: Never Used   Substance Use Topics    Alcohol use: No    Drug use: No        No Known Allergies,   Past Medical History:   Diagnosis Date    ADHD (attention deficit hyperactivity disorder)     Asperger syndrome     Class 3 severe obesity due to excess calories with serious comorbidity and body mass index (BMI) of 45.0 to 49.9 in adult [facility]) 2019    Epilepsy ([facility]     Heart murmur of      History of blood transfusion     Menorrhagia     Migraines     OCD (obsessive compulsive disorder)     ODD (oppositional defiant disorder)     Premature birth     Tourette's     Type 2 diabetes mellitus without complication, without long-term current use of insulin ([facility] [address] 2018   ,   Past Surgical History:   Procedure Laterality Date    TONSILLECTOMY      UPPER GASTROINTESTINAL ENDOSCOPY      at birth.   ,   Social History     Tobacco Use    Smoking status: Never Smoker    Smokeless tobacco: Never Used   Substance Use Topics    Alcohol use: No    Drug use: No   ,   Family History   Problem Relation Age of Onset    Heart Disease Maternal Grandmother     Diabetes Maternal Grandfather    ,   Immunization History   Administered Date(s) Administered    DTaP 2002, 2003, 2003    [address]) 2018    Hepatitis A Ped/Adol ([facility]) 2018, 2018    Hepatitis B 2002, 2003    Hib, unspecified 2002, 2003    Influenza, Quadv, IM, (6 mo and older Fluzone, Flulaval, Fluarix and 3 yrs and older Afluria) 01/29/2018    MMR 01/16/2003    Meningococcal MCV4P (Menactra) 01/29/2018    Pneumococcal Conjugate 7-valent (Ronnald Radha) 11/19/2002    Polio IPV (IPOL) 03/12/2002, 01/16/2003    Varicella (Varivax) 01/16/2003   ,   Health Maintenance   Topic Date Due    Hepatitis B vaccine (3 of 3 - 3-dose primary series) 03/13/2003    Salli Estimable (MMR) vaccine (2 of 2 - Standard series) 09/27/2005    Varicella vaccine (2 of 2 - 2-dose childhood series) 09/27/2005    Pneumococcal 0-64 years Vaccine (1 of 1 - PPSV23) 09/27/2007    DTaP/Tdap/Td vaccine (4 - Tdap) 09/27/2008    Diabetic retinal exam  09/27/2011    HIV screen  09/27/2016    Lipid screen  11/13/2016    Chlamydia screen  09/27/2017    HPV vaccine (2 - 3-dose series) 06/08/2018    Diabetic foot exam  05/11/2019    Diabetic microalbuminuria test  09/27/2019    Flu vaccine (Season Ended) 09/01/2020    A1C test (Diabetic or Prediabetic)  10/30/2020    Hepatitis A vaccine  Completed    Hib vaccine  Completed    Meningococcal (ACWY) vaccine  Completed    Polio vaccine  Aged Out       PHYSICAL EXAMINATION:  [ INSTRUCTIONS:  \"[x]\" Indicates a positive item  \"[]\" Indicates a negative item  -- DELETE ALL ITEMS NOT EXAMINED]  Vital Signs: (As obtained by patient/caregiver or practitioner observation)    Blood pressure-  Heart rate-    Respiratory rate-    Temperature-  Pulse oximetry-     Constitutional: [x] Appears well-developed and well-nourished [] No apparent distress      [] Abnormal-   Mental status  [x] Alert and awake  [x] Oriented to person/place/time [x]Able to follow commands      Eyes:  EOM    [x]  Normal  [] Abnormal-  Sclera  [x]  Normal  [] Abnormal -         Discharge []  None visible  [] Abnormal -    HENT:   [x] Normocephalic, atraumatic.   [] Abnormal   [x] Mouth/Throat: Mucous membranes are moist.     External Ears [x] Normal  [] Abnormal-     Neck: [x] No visualized mass     Pulmonary/Chest: [x] Respiratory effort normal.  [x] No visualized signs of difficulty breathing or respiratory distress        [] Abnormal-      Musculoskeletal:   [x] Normal gait with no signs of ataxia         [x] Normal range of motion of neck        [] Abnormal-       Neurological:        [x] No Facial Asymmetry (Cranial nerve 7 motor function) (limited exam to video visit)          [x] No gaze palsy        [] Abnormal-         Skin:        [x] No significant exanthematous lesions or discoloration noted on facial skin         [] Abnormal-            Psychiatric:       [x] Normal Affect [x] No Hallucinations        [] Abnormal-     Other pertinent observable physical exam findings-     ASSESSMENT/PLAN:    ICD-10-CM    1. Attention deficit hyperactivity disorder (ADHD), combined type F90.2    2. Class 3 severe obesity due to excess calories with serious comorbidity and body mass index (BMI) of 45.0 to 49.9 in adult ([facility]) E66.01     Z68.42    3. Current mild episode of major depressive disorder without prior episode ([facility] F32.0    4. Type 2 diabetes mellitus without complication, without long-term current use of insulin ([facility]) E11.9    5. Cervical migraine syndrome G43.109        I suspect that she is having mild cervical migraine syndrome which may be secondary to stress as well as posture changes as she is likely using electronics at home more and flexing her neck more often. I have discussed proper posture and her mom is a top-notch massage therapist and we will have her do some massage of the occipital muscle strap region. I will follow-up in 4 weeks with her due to increased stress. No orders of the defined types were placed in this encounter. No orders of the defined types were placed in this encounter. Return in about 4 weeks (around 5/11/2020) for f/u stress.     Arleen Sargent is a 23 y.o. female being evaluated by a Virtual Visit (video visit) encounter to address concerns as No

## 2020-12-29 NOTE — PROGRESS NOTE ADULT - SUBJECTIVE AND OBJECTIVE BOX
Orthopaedic Surgery Progress Note    Post-operative day #2 s/p R  ORIF and L scaphoid ORIF    Subjective:     Patient seen and examined. Patient comfortable without complaints, pain controlled.  Denies chest pain, shortness of breath, nausea/vomiting, numbness/tingling.    Objective:    Vital Signs Last 24 Hrs  T(C): 36.8 (12-29-20 @ 07:52), Max: 37.2 (12-29-20 @ 05:26)  T(F): 98.2 (12-29-20 @ 07:52), Max: 99 (12-29-20 @ 05:26)  HR: 80 (12-29-20 @ 07:52) (77 - 80)  BP: 131/75 (12-29-20 @ 07:52) (127/67 - 131/75)  RR: 18 (12-29-20 @ 07:52) (18 - 18)  SpO2: 97% (12-29-20 @ 07:52) (94% - 97%)  AVSS    PE:  General: Patient alert and oriented, NAD  Dressings: Clean/dry/intact splints BUE   Pulses: brisk cap refill in all 10 fingers   Sensation: SILT in all 10 fingers   Motor: 5/5 /biceps/triceps BUE    A/P: 71yo Male POD#2 s/p R DR PINEDAF, L scaphoid ORIF   1. Pain control as needed  2. DVT prophylaxis: SCDs  3. OT, weight-bearing status: NWB BUE, can WB for eating   4. Dispo: Home today    Ortho Pager [phone]
OVERNIGHT EVENTS: Temp to 100.8, asymptomatic. Started urinating w/o difficulty or retention.     SUBJECTIVE / INTERVAL HPI: Patient seen and examined at bedside. Afebrile, no complaints, pain controlled. Trying to get used to limited hand mobility.     VITAL SIGNS:  Vital Signs Last 24 Hrs  T(C): 36.8 (29 Dec 2020 07:52), Max: 38.2 (28 Dec 2020 20:32)  T(F): 98.2 (29 Dec 2020 07:52), Max: 100.8 (28 Dec 2020 20:32)  HR: 80 (29 Dec 2020 07:52) (76 - 91)  BP: 131/75 (29 Dec 2020 07:52) (103/63 - 131/75)  BP(mean): --  RR: 18 (29 Dec 2020 07:52) (17 - 18)  SpO2: 97% (29 Dec 2020 07:52) (94% - 99%)    PHYSICAL EXAM:    General: WDWN, non toxic appearing  HEENT: NC/AT;  Neck: supple  Cardiovascular: +S1/S2; RRR  Respiratory: CTA b/l; no W/R/R  Gastrointestinal: soft, NT/ND; no suprapubic tenderness  Extremities: WWP; b/l UE in casts, able to move both hands/fingers, sensation intact    MEDICATIONS:  MEDICATIONS  (STANDING):  influenza  Vaccine (HIGH DOSE) 0.7 milliLiter(s) IntraMuscular once  lactated ringers. 1000 milliLiter(s) (120 mL/Hr) IV Continuous <Continuous>  pantoprazole    Tablet 40 milliGRAM(s) Oral before breakfast  senna 2 Tablet(s) Oral at bedtime  tobramycin 0.3% Ophthalmic Solution 1 Drop(s) Right EYE two times a day    MEDICATIONS  (PRN):  acetaminophen   Tablet .. 1000 milliGRAM(s) Oral every 8 hours PRN Temp greater or equal to 38C (100.4F), Mild Pain (1 - 3)  HYDROmorphone  Injectable 0.5 milliGRAM(s) IV Push every 4 hours PRN breakthrough pain  ibuprofen  Tablet. 400 milliGRAM(s) Oral every 8 hours PRN Temp greater or equal to 38C (100.4F)  ondansetron Injectable 4 milliGRAM(s) IV Push every 6 hours PRN Nausea and/or Vomiting  oxyCODONE    IR 5 milliGRAM(s) Oral every 4 hours PRN Moderate Pain (4 - 6)  oxyCODONE    IR 10 milliGRAM(s) Oral every 4 hours PRN Severe Pain (7 - 10)      ALLERGIES:  Allergies    No Known Allergies    Intolerances        LABS:            Urinalysis Basic - ( 28 Dec 2020 12:55 )    Color: Yellow / Appearance: Clear / S.025 / pH: x  Gluc: x / Ketone: >=80 mg/dL  / Bili: Negative / Urobili: 0.2 E.U./dL   Blood: x / Protein: NEGATIVE mg/dL / Nitrite: NEGATIVE   Leuk Esterase: NEGATIVE / RBC: Many /HPF / WBC < 5 /HPF   Sq Epi: x / Non Sq Epi: 0-5 /HPF / Bacteria: Present /HPF      CAPILLARY BLOOD GLUCOSE          RADIOLOGY & ADDITIONAL TESTS: Reviewed.    ASSESSMENT:    PLAN: 
Ortho Post Op Check    Procedure: R scaphoid and L wrist ORIF  Surgeon: Dr. Watkins and Thierno     Pt comfortable without complaints, pain controlled  Denies CP, SOB, N/V, numbness/tingling     Vital Signs Last 24 Hrs  T(C): 37.2 (12-27-20 @ 22:19), Max: 37.2 (12-27-20 @ 22:19)  T(F): 98.9 (12-27-20 @ 22:19), Max: 98.9 (12-27-20 @ 22:19)  HR: 76 (12-27-20 @ 22:19) (61 - 82)  BP: 128/68 (12-27-20 @ 22:19) (114/67 - 144/68)  BP(mean): 89 (12-27-20 @ 20:15) (88 - 100)  RR: 18 (12-27-20 @ 22:19) (13 - 23)  SpO2: 99% (12-27-20 @ 22:19) (99% - 100%)      General: Pt Alert and oriented, NAD  DSG C/D/I x2  Pulses: cap refill < 2 secs b/l  Sensation: SILT across all digits  Motor: wiggling all digits                          11.6   7.19  )-----------( 172      ( 27 Dec 2020 06:54 )             34.8   27 Dec 2020 06:54    140    |  106    |  19     ----------------------------<  101    3.6     |  24     |  0.96       TPro  7.3    /  Alb  4.1    /  TBili  0.7    /  DBili  x      /  AST  32     /  ALT  19     /  AlkPhos  67     26 Dec 2020 17:10      Post-op X-Ray:  hardware in place    A/P: 70yMale POD#0 s/p above procedure  - Stable  - Pain Control  - DVT ppx: asa  - Post op abx: ancef  - PT, WBS: NWB blue    Ortho Pager [phone]
Ortho Progress Note    Procedure: R scaphoid and L wrist ORIF  Surgeon: Dr. Watkins and Iain    Pt comfortable without complaints, pain controlled  Denies CP, SOB, N/V, numbness/tingling     Vital Signs Last 24 Hrs  T(C): 38.7 (28 Dec 2020 06:41), Max: 38.7 (28 Dec 2020 06:41)  T(F): 101.6 (28 Dec 2020 06:41), Max: 101.6 (28 Dec 2020 06:41)  HR: 90 (28 Dec 2020 05:28) (61 - 90)  BP: 130/76 (28 Dec 2020 05:28) (114/67 - 144/68)  BP(mean): 94 (28 Dec 2020 05:28) (88 - 100)  RR: 18 (28 Dec 2020 05:28) (13 - 23)  SpO2: 97% (28 Dec 2020 05:28) (96% - 100%)    General: Pt Alert and oriented, NAD  DSG C/D/I x2  Pulses: cap refill < 2 secs b/l  Sensation: SILT across all digits  Motor: wiggling all digits                                     11.6   7.19  )-----------( 172      ( 27 Dec 2020 06:54 )             34.8       A/P: 70yMale POD#1 s/p above procedure  - Stable  - Pain Control  - DVT ppx: asa  - Post op abx: ancef  - PT, WBS: NWB blue, except for feeding  - Dispo: rehab    Ortho Pager [phone]
Ortho Progress Note    Procedure: R scaphoid and L wrist ORIF  Surgeon: Dr. Watkins and Iain    Pt comfortable without complaints, pain controlled  Denies CP, SOB, N/V, numbness/tingling     Vital Signs Last 24 Hrs  T(C): 37.2 (29 Dec 2020 05:26), Max: 38.2 (28 Dec 2020 20:32)  T(F): 99 (29 Dec 2020 05:26), Max: 100.8 (28 Dec 2020 20:32)  HR: 77 (29 Dec 2020 05:26) (76 - 92)  BP: 127/67 (29 Dec 2020 05:26) (103/63 - 127/67)  BP(mean): --  RR: 18 (29 Dec 2020 05:26) (16 - 18)  SpO2: 94% (29 Dec 2020 05:26) (94% - 99%)    General: Pt Alert and oriented, NAD  DSG C/D/I x2  Pulses: cap refill < 2 secs b/l  Sensation: SILT across all digits  Motor: wiggling all digits                                A/P: 70yMale POD#2 s/p above procedure  - Stable  - Pain Control  - DVT ppx: asa  - Post op abx: ancef  - PT, WBS: NWB blue, except for feeding  - Dispo: home today, cleared OT    Ortho Pager [phone]
Orthopaedic Surgery Progress Note    Patient seen and examined. GEORGETTE. Patient without complaints. Pain controlled. Denies CP, SOB, N/V, calf pain.  Pt is POD #1 s/p left distal radius, right scaphoid ORIF   Pt febrile to 101.6 this AM - resolved with motrin/tylenol - CXR shows atelectasis, repeat UA pending (pre op UA negative), WBC WNL  Pt states he has not urinated in > 12 h, bladder scan approximately 400 ccs - pt denies bladder/abdominal discomfort or urge to urinate, states he has difficulty urinating at baseline.  - Addendum:  Pt voided 50 ccs at 10Am        Vital Signs Last 24 Hrs  T(C): 37.3 (28 Dec 2020 08:42), Max: 38.7 (28 Dec 2020 06:41)  T(F): 99.1 (28 Dec 2020 08:42), Max: 101.6 (28 Dec 2020 06:41)  HR: 92 (28 Dec 2020 08:42) (61 - 92)  BP: 120/69 (28 Dec 2020 08:42) (114/67 - 144/68)  BP(mean): 94 (28 Dec 2020 05:28) (88 - 100)  RR: 16 (28 Dec 2020 08:42) (13 - 23)  SpO2: 95% (28 Dec 2020 08:42) (95% - 100%)        Physical Exam:  Pt laying comfortably in bed, NAD.  Skin warm and well perfused, no visible erythema/ecchymoses.  Bilateral upper extremity splints C/D/I   Sensation intact to bilateral UE distally. Actively wiggles all digits of bilateral upper extremities. AIN/PIN intact.     LABS                        11.6   7.19  )-----------( 172      ( 27 Dec 2020 06:54 )             34.8                              PT/INR - ( 26 Dec 2020 17:10 )   PT: 12.0 sec;   INR: 1.00          PTT - ( 26 Dec 2020 17:10 )  PTT:30.4 sec  12-27    140  |  106  |  19  ----------------------------<  101<H>  3.6   |  24  |  0.96    Ca    8.5      27 Dec 2020 06:54    TPro  7.3  /  Alb  4.1  /  TBili  0.7  /  DBili  x   /  AST  32  /  ALT  19  /  AlkPhos  67  12-26        A/P: 70M POD #1 s/p right scaphoid, left distal radius ORIF     CONTINUE:        1. PT: NWTAINA MARLEY and JOSEE - pt can use extremities to eat   2. DVT prophylaxis: SCDs  3. Pain Control as needed   4. Dispo: Home today vs tomorrow       
S: No acute events overnight. For OR today.    Vital Signs Last 24 Hrs  T(C): 37.6 (27 Dec 2020 05:14), Max: 37.6 (27 Dec 2020 05:14)  T(F): 99.6 (27 Dec 2020 05:14), Max: 99.6 (27 Dec 2020 05:14)  HR: 92 (27 Dec 2020 05:14) (67 - 92)  BP: 123/70 (27 Dec 2020 05:14) (123/70 - 156/69)  BP(mean): --  RR: 17 (27 Dec 2020 05:14) (16 - 18)  SpO2: 95% (27 Dec 2020 05:14) (95% - 100%)  I&O's Summary    26 Dec 2020 07:01  -  27 Dec 2020 07:00  --------------------------------------------------------  IN: 840 mL / OUT: 600 mL / NET: 240 mL      Splint in place  Motor: firing AIN/PIN/ulnar  Sensation: SILT distal digits  Pulses: WWP, cap refill brisk                          11.6   7.19  )-----------( 172      ( 27 Dec 2020 06:54 )             34.8     12-27    140  |  106  |  19  ----------------------------<  101<H>  3.6   |  24  |  0.96    Ca    8.5      27 Dec 2020 06:54    TPro  7.3  /  Alb  4.1  /  TBili  0.7  /  DBili  x   /  AST  32  /  ALT  19  /  AlkPhos  67  12-26      MEDICATIONS  (STANDING):  influenza  Vaccine (HIGH DOSE) 0.7 milliLiter(s) IntraMuscular once  lactated ringers. 1000 milliLiter(s) (120 mL/Hr) IV Continuous <Continuous>  pantoprazole    Tablet 40 milliGRAM(s) Oral before breakfast  senna 2 Tablet(s) Oral at bedtime    MEDICATIONS  (PRN):  acetaminophen   Tablet .. 1000 milliGRAM(s) Oral every 8 hours PRN Temp greater or equal to 38C (100.4F), Mild Pain (1 - 3)  HYDROmorphone  Injectable 0.5 milliGRAM(s) IV Push every 4 hours PRN breakthrough pain  ondansetron Injectable 4 milliGRAM(s) IV Push every 6 hours PRN Nausea and/or Vomiting  oxyCODONE    IR 5 milliGRAM(s) Oral every 4 hours PRN Moderate Pain (4 - 6)  oxyCODONE    IR 10 milliGRAM(s) Oral every 4 hours PRN Severe Pain (7 - 10)      A/P:  70y Male with L DRF, now splinted for OR today  -Stable  -Pain Control  -NWB LUE  -DVT ppx: SCDs  -NPO for OR today  -f/u AM labs  -Dispo: home after OR
OVERNIGHT EVENTS: Fever overnight. Retaining urine, voided 50 cc.     SUBJECTIVE / INTERVAL HPI: Patient seen and examined at bedside. Pain controlled. Denies any abdominal pain, cough, SOB. Has hx of "lazy bladder" but has never been diagnosed w/ BPH. He denies any suprapubic tenderness.     VITAL SIGNS:  Vital Signs Last 24 Hrs  T(C): 37 (28 Dec 2020 12:44), Max: 38.7 (28 Dec 2020 06:41)  T(F): 98.6 (28 Dec 2020 12:44), Max: 101.6 (28 Dec 2020 06:41)  HR: 76 (28 Dec 2020 12:44) (61 - 92)  BP: 103/63 (28 Dec 2020 12:44) (103/63 - 144/68)  BP(mean): 94 (28 Dec 2020 05:28) (88 - 100)  RR: 18 (28 Dec 2020 12:44) (13 - 23)  SpO2: 96% (28 Dec 2020 12:44) (95% - 100%)    PHYSICAL EXAM:    General: WDWN, non toxic appearing  HEENT: NC/AT;  Neck: supple  Cardiovascular: +S1/S2; RRR  Respiratory: CTA b/l; no W/R/R  Gastrointestinal: soft, NT/ND; no suprapubic tenderness  Extremities: WWP; b/l UE in casts, able to move both hands/fingers, sensation intact    MEDICATIONS:  MEDICATIONS  (STANDING):  influenza  Vaccine (HIGH DOSE) 0.7 milliLiter(s) IntraMuscular once  lactated ringers. 1000 milliLiter(s) (120 mL/Hr) IV Continuous <Continuous>  pantoprazole    Tablet 40 milliGRAM(s) Oral before breakfast  senna 2 Tablet(s) Oral at bedtime  tobramycin 0.3% Ophthalmic Solution 1 Drop(s) Right EYE two times a day    MEDICATIONS  (PRN):  acetaminophen   Tablet .. 1000 milliGRAM(s) Oral every 8 hours PRN Temp greater or equal to 38C (100.4F), Mild Pain (1 - 3)  HYDROmorphone  Injectable 0.5 milliGRAM(s) IV Push every 4 hours PRN breakthrough pain  ibuprofen  Tablet. 400 milliGRAM(s) Oral every 8 hours PRN Temp greater or equal to 38C (100.4F)  ondansetron Injectable 4 milliGRAM(s) IV Push every 6 hours PRN Nausea and/or Vomiting  oxyCODONE    IR 5 milliGRAM(s) Oral every 4 hours PRN Moderate Pain (4 - 6)  oxyCODONE    IR 10 milliGRAM(s) Oral every 4 hours PRN Severe Pain (7 - 10)      ALLERGIES:  Allergies    No Known Allergies    Intolerances        LABS:                        11.6   7.19  )-----------( 172      ( 27 Dec 2020 06:54 )             34.8         140  |  106  |  19  ----------------------------<  101<H>  3.6   |  24  |  0.96    Ca    8.5      27 Dec 2020 06:54    TPro  7.3  /  Alb  4.1  /  TBili  0.7  /  DBili  x   /  AST  32  /  ALT  19  /  AlkPhos  67  12    PT/INR - ( 26 Dec 2020 17:10 )   PT: 12.0 sec;   INR: 1.00          PTT - ( 26 Dec 2020 17:10 )  PTT:30.4 sec  Urinalysis Basic - ( 28 Dec 2020 12:55 )    Color: Yellow / Appearance: Clear / S.025 / pH: x  Gluc: x / Ketone: >=80 mg/dL  / Bili: Negative / Urobili: 0.2 E.U./dL   Blood: x / Protein: NEGATIVE mg/dL / Nitrite: NEGATIVE   Leuk Esterase: NEGATIVE / RBC: Many /HPF / WBC < 5 /HPF   Sq Epi: x / Non Sq Epi: 0-5 /HPF / Bacteria: Present /HPF      CAPILLARY BLOOD GLUCOSE          RADIOLOGY & ADDITIONAL TESTS: Reviewed.    ASSESSMENT:    PLAN: 
  Patient is a 70y old  Male who presents with a chief complaint of L DR fracture (27 Dec 2020 08:09)      INTERVAL HPI/OVERNIGHT EVENTS:  patient doing well today says pain is well controlled and other than wrist pain has no complaints this morning    Review of Systems: 12 point review of systems otherwise negative    MEDICATIONS  (STANDING):  ceFAZolin   IVPB 2000 milliGRAM(s) IV Intermittent every 8 hours  influenza  Vaccine (HIGH DOSE) 0.7 milliLiter(s) IntraMuscular once  lactated ringers. 1000 milliLiter(s) (120 mL/Hr) IV Continuous <Continuous>  pantoprazole    Tablet 40 milliGRAM(s) Oral before breakfast  senna 2 Tablet(s) Oral at bedtime    MEDICATIONS  (PRN):  acetaminophen   Tablet .. 1000 milliGRAM(s) Oral every 8 hours PRN Temp greater or equal to 38C (100.4F), Mild Pain (1 - 3)  HYDROmorphone  Injectable 0.5 milliGRAM(s) IV Push every 4 hours PRN breakthrough pain  ondansetron Injectable 4 milliGRAM(s) IV Push every 6 hours PRN Nausea and/or Vomiting  oxyCODONE    IR 5 milliGRAM(s) Oral every 4 hours PRN Moderate Pain (4 - 6)  oxyCODONE    IR 10 milliGRAM(s) Oral every 4 hours PRN Severe Pain (7 - 10)      Allergies    No Known Allergies    Intolerances          Vital Signs Last 24 Hrs  T(C): 36.7 (27 Dec 2020 20:15), Max: 37.6 (27 Dec 2020 05:14)  T(F): 98 (27 Dec 2020 20:15), Max: 99.6 (27 Dec 2020 05:14)  HR: 62 (27 Dec 2020 20:30) (61 - 92)  BP: 130/62 (27 Dec 2020 20:15) (114/67 - 147/77)  BP(mean): 89 (27 Dec 2020 20:15) (88 - 100)  RR: 21 (27 Dec 2020 20:30) (13 - 23)  SpO2: 99% (27 Dec 2020 20:30) (95% - 100%)  CAPILLARY BLOOD GLUCOSE          - @ 07:01  -   @ 07:00  --------------------------------------------------------  IN: 840 mL / OUT: 600 mL / NET: 240 mL     @ 07:01  -   @ 21:06  --------------------------------------------------------  IN: 1340 mL / OUT: 1850 mL / NET: -510 mL        Physical Exam:    Daily Height in cm: 188 (27 Dec 2020 01:58)    Daily   General:  Well appearing, NAD, not cachetic  HEENT:  Nonicteric, PERRLA  CV:  RRR, no murmur, no JVD  Lungs:  CTA B/L, no wheezes, rales, rhonchi  Abdomen:  Soft, non-tender, no distended, positive BS, no hepatosplenomegaly  Extremities:  2+ pulses, no c/c, no edema  Skin:  Warm and dry, no rashes  :  No monge  Neuro:  AAOx3, non-focal, CN II-XII grossly intact  No Restraints    LABS:                        11.6   7.19  )-----------( 172      ( 27 Dec 2020 06:54 )             34.8         140  |  106  |  19  ----------------------------<  101<H>  3.6   |  24  |  0.96    Ca    8.5      27 Dec 2020 06:54    TPro  7.3  /  Alb  4.1  /  TBili  0.7  /  DBili  x   /  AST  32  /  ALT  19  /  AlkPhos  67  12-    PT/INR - ( 26 Dec 2020 17:10 )   PT: 12.0 sec;   INR: 1.00          PTT - ( 26 Dec 2020 17:10 )  PTT:30.4 sec  Urinalysis Basic - ( 26 Dec 2020 23:18 )    Color: Yellow / Appearance: Clear / S.020 / pH: x  Gluc: x / Ketone: NEGATIVE  / Bili: Negative / Urobili: 0.2 E.U./dL   Blood: x / Protein: NEGATIVE mg/dL / Nitrite: NEGATIVE   Leuk Esterase: NEGATIVE / RBC: x / WBC x   Sq Epi: x / Non Sq Epi: x / Bacteria: x

## 2020-12-29 NOTE — DISCHARGE NOTE NURSING/CASE MANAGEMENT/SOCIAL WORK - PATIENT PORTAL LINK FT
You can access the FollowMyHealth Patient Portal offered by [facility] by registering at the following website: [email or web address]. By joining [facility]’s FollowMyHealth portal, you will also be able to view your health information using other applications (apps) compatible with our system.

## 2020-12-29 NOTE — PROGRESS NOTE ADULT - PROVIDER SPECIALTY LIST ADULT
Hospitalist
Orthopedics
Hospitalist
Orthopedics
Orthopedics
Internal Medicine

## 2021-01-13 DIAGNOSIS — J98.11 ATELECTASIS: ICD-10-CM

## 2021-01-13 DIAGNOSIS — S52.572A OTHER INTRAARTICULAR FRACTURE OF LOWER END OF LEFT RADIUS, INITIAL ENCOUNTER FOR CLOSED FRACTURE: ICD-10-CM

## 2021-01-13 DIAGNOSIS — R33.0 DRUG INDUCED RETENTION OF URINE: ICD-10-CM

## 2021-01-13 DIAGNOSIS — S62.001A UNSPECIFIED FRACTURE OF NAVICULAR [SCAPHOID] BONE OF RIGHT WRIST, INITIAL ENCOUNTER FOR CLOSED FRACTURE: ICD-10-CM

## 2021-01-13 DIAGNOSIS — T41.205A ADVERSE EFFECT OF UNSPECIFIED GENERAL ANESTHETICS, INITIAL ENCOUNTER: ICD-10-CM

## 2021-01-13 DIAGNOSIS — K21.9 GASTRO-ESOPHAGEAL REFLUX DISEASE WITHOUT ESOPHAGITIS: ICD-10-CM
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
## 2021-01-13 DIAGNOSIS — D62 ACUTE POSTHEMORRHAGIC ANEMIA: ICD-10-CM
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
## 2021-01-13 DIAGNOSIS — R50.82 POSTPROCEDURAL FEVER: ICD-10-CM

## 2021-01-21 PROCEDURE — C1713: CPT

## 2021-01-21 PROCEDURE — 73200 CT UPPER EXTREMITY W/O DYE: CPT

## 2021-01-21 PROCEDURE — 73110 X-RAY EXAM OF WRIST: CPT

## 2021-01-21 PROCEDURE — 36415 COLL VENOUS BLD VENIPUNCTURE: CPT

## 2021-01-21 PROCEDURE — 97161 PT EVAL LOW COMPLEX 20 MIN: CPT

## 2021-01-21 PROCEDURE — 86901 BLOOD TYPING SEROLOGIC RH(D): CPT

## 2021-01-21 PROCEDURE — 86900 BLOOD TYPING SEROLOGIC ABO: CPT

## 2021-01-21 PROCEDURE — 71045 X-RAY EXAM CHEST 1 VIEW: CPT

## 2021-01-21 PROCEDURE — 81003 URINALYSIS AUTO W/O SCOPE: CPT

## 2021-01-21 PROCEDURE — 93005 ELECTROCARDIOGRAM TRACING: CPT | Mod: XU

## 2021-01-21 PROCEDURE — 73120 X-RAY EXAM OF HAND: CPT

## 2021-01-21 PROCEDURE — C1889: CPT

## 2021-01-21 PROCEDURE — 85027 COMPLETE CBC AUTOMATED: CPT

## 2021-01-21 PROCEDURE — C1769: CPT

## 2021-01-21 PROCEDURE — 73100 X-RAY EXAM OF WRIST: CPT

## 2021-01-21 PROCEDURE — 81001 URINALYSIS AUTO W/SCOPE: CPT

## 2021-01-21 PROCEDURE — 80048 BASIC METABOLIC PNL TOTAL CA: CPT

## 2021-01-21 PROCEDURE — 86803 HEPATITIS C AB TEST: CPT

## 2021-01-21 PROCEDURE — 25605 CLTX DST RDL FX/EPHYS SEP W/: CPT | Mod: LT

## 2021-01-21 PROCEDURE — 99285 EMERGENCY DEPT VISIT HI MDM: CPT | Mod: 25

## 2021-01-21 PROCEDURE — 86850 RBC ANTIBODY SCREEN: CPT

## 2023-10-04 NOTE — PROCEDURE NOTE - NSINDICATIONS_GEN_A_CORE
Detail Level: Simple
Detail Level: Zone
fractured extremity/displaced fracture
Detail Level: Detailed